# Patient Record
Sex: MALE | URBAN - METROPOLITAN AREA
[De-identification: names, ages, dates, MRNs, and addresses within clinical notes are randomized per-mention and may not be internally consistent; named-entity substitution may affect disease eponyms.]

---

## 2018-05-15 ENCOUNTER — OFFICE VISIT (OUTPATIENT)
Dept: FAMILY MEDICINE | Facility: CLINIC | Age: 33
End: 2018-05-15

## 2018-05-15 VITALS
DIASTOLIC BLOOD PRESSURE: 76 MMHG | TEMPERATURE: 97.7 F | HEART RATE: 72 BPM | WEIGHT: 158 LBS | SYSTOLIC BLOOD PRESSURE: 138 MMHG | HEIGHT: 66 IN | BODY MASS INDEX: 25.39 KG/M2

## 2018-05-15 DIAGNOSIS — B00.9 HERPES SIMPLEX: Primary | ICD-10-CM

## 2018-05-15 PROCEDURE — 99213 OFFICE O/P EST LOW 20 MIN: CPT | Performed by: FAMILY MEDICINE

## 2018-05-15 RX ORDER — ACYCLOVIR 400 MG/1
400 TABLET ORAL 3 TIMES DAILY
Qty: 15 TABLET | Refills: 5 | Status: SHIPPED | OUTPATIENT
Start: 2018-05-15

## 2018-05-15 NOTE — MR AVS SNAPSHOT
After Visit Summary   5/15/2018    Yifan Smith    MRN: [id number]           Patient Information     Date Of Birth          1985        Visit Information        Provider Department      5/15/2018 11:00 AM Sergey Dunn MD [facility]        Today's Diagnoses     Herpes simplex    -  1      Care Instructions    Take Acyclovir if you have another breakout.  Taking acyclovir now will not change the course of your resolving rash, so you are not advised to take this medication.    See your primary provider in [place] for further treatment of herpes simplex.  Herpes  If you have herpes, you re not alone. Millions of Americans have it. Herpes has no cure. But you can control it and learn how to protect yourself and others from outbreaks.  What is herpes?  Herpes is a chronic (lifelong) virus. It can cause sores and discomfort. You get it from contact with someone who carries the virus. If sores occur on the lips, you have oral herpes. If sores occur on the penis or around the vagina, you have genital herpes.  Herpes outbreaks    The first outbreak of herpes sores is usually the most severe. Then, the soldiers of the body s immune system, white blood cells, produce antibodies. These antibodies help neutralize the herpes virus and may help make future attacks less severe.    Some people have only one outbreak of sores. Some people have periods of frequent outbreaks (every few weeks). Outbreaks of herpes sores usually happen less often over time.    Herpes sores may appear without a cause. Outbreaks are more likely when the immune system is weak. Other viral infections (such as a cold) can cause outbreaks. Stress from a poor diet, fatigue, or emotional upset can lead to outbreaks of sores. Exposure to strong sunlight often causes herpes sores to reappear.   To help prevent outbreaks    To prevent oral herpes outbreaks, avoid overexposure to wind, sun, and extreme temperatures.  Use sunscreen and lip balm on affected areas.    If you are having frequent outbreaks, ask your healthcare provider about medicines that can help prevent outbreaks.  How herpes spreads to others  Herpes can be spread during an outbreak. But even without sores present, you can still  shed  the virus and infect others. You can take steps to prevent this.  To protect yourself and others    If you have an oral sore, avoid kissing and oral-genital contact.    If you have a genital sore, avoid intercourse. Also avoid oral-genital contact.    Wash your hands after touching a sore.    Use a condom each time you have sex. You can pass the virus even when sores aren t present. If you re unsure about the timing of certain kinds of physical contact, ask your health care provider.    Tell any new partners that you have herpes.    If you re a woman, have Pap tests as often as your healthcare provider recommends.    A woman can spread herpes to their  during the birth process, whether or not they have an active genital sore. If pregnant, don't forget to tell your healthcare provider early in the pregnancy.     In some cases, daily antiviral medicine (acyclovir, famcyclovir, or valavyclovir), in addition to consistent condom use, may reduce your chances of spreading herpes to an uninfected partner. Ask your healthcare provider if this medicine would be helpful for you.  Resources  American Social Health Association STD Hotline  930.716.9461  www.ashastd.org  Centers for Disease Control and Prevention  376.392.6471  www.cdc.gov/std   Date Last Reviewed: 2016-2017 [employer]. [address] [place] [address], [place] [address]. All rights reserved. This information is not intended as a substitute for professional medical care. Always follow your healthcare professional's instructions.                Follow-ups after your visit        Follow-up notes from your care team     Return if symptoms worsen or fail to  "improve.      Who to contact     If you have questions or need follow up information about today's clinic visit or your schedule please contact [facility] directly at [phone].  Normal or non-critical lab and imaging results will be communicated to you by MyChart, letter or phone within 4 business days after the clinic has received the results. If you do not hear from us within 7 days, please contact the clinic through MyChart or phone. If you have a critical or abnormal lab result, we will notify you by phone as soon as possible.  Submit refill requests through Consumer Agent Portal (CAP) or call your pharmacy and they will forward the refill request to us. Please allow 3 business days for your refill to be completed.          Additional Information About Your Visit        [facility] Information     Consumer Agent Portal (CAP) lets you send messages to your doctor, view your test results, renew your prescriptions, schedule appointments and more. To sign up, go to [email or web address] (CAP) . Click on \"Log in\" on the left side of the screen, which will take you to the Welcome page. Then click on \"Sign up Now\" on the right side of the page.     You will be asked to enter the access code listed below, as well as some personal information. Please follow the directions to create your username and password.     Your access code is: [email or web address]  Expires: 2018 11:38 AM     Your access code will  in 90 days. If you need help or a new code, please call your [facility] clinic or [phone].        Care EveryWhere ID     This is your Care EveryWhere ID. This could be used by other organizations to access your [facility] medical records  [id number]        Your Vitals Were     Pulse Temperature Height BMI (Body Mass Index)          72 97.7  F (36.5  C) (Tympanic) 5' 6\" (1.676 m) 25.5 kg/m2         Blood Pressure from Last 3 Encounters:   05/15/18 138/76    Weight from Last 3 Encounters:   05/15/18 158 lb (71.7 kg)              Today, you had " the following     No orders found for display         Today's Medication Changes          These changes are accurate as of 5/15/18 11:38 AM.  If you have any questions, ask your nurse or doctor.               Start taking these medicines.        Dose/Directions    acyclovir 400 MG tablet   Commonly known as:  ZOVIRAX   Used for:  Herpes simplex   Started by:  Sergey Dunn MD        Dose:  400 mg   Take 1 tablet (400 mg) by mouth 3 times daily   Quantity:  15 tablet   Refills:  5            Where to get your medicines      These medications were sent to [facility] [place], [place] - [address]  [address] [address]     Phone:  [phone]     acyclovir 400 MG tablet                Primary Care Provider    None Specified       No primary provider on file.        Equal Access to Services     LORRAINE ZAPIEN : Nadege Holloway, waluly cruz, qaybjaja kaalmasunil addison, ward pierre . So [facility] [phone].    ATENCIÓN: Si habla español, tiene a cedeno disposición servicios gratuitos de asistencia lingüística. Susannaame al [phone].    We comply with applicable federal civil rights laws and [place] laws. We do not discriminate on the basis of race, color, national origin, age, disability, sex, sexual orientation, or gender identity.            Thank you!     Thank you for choosing [facility]  for your care. Our goal is always to provide you with excellent care. Hearing back from our patients is one way we can continue to improve our services. Please take a few minutes to complete the written survey that you may receive in the mail after your visit with us. Thank you!             Your Updated Medication List - Protect others around you: Learn how to safely use, store and throw away your medicines at www.disposemymeds.org.          This list is accurate as of 5/15/18 11:38 AM.  Always use your most recent med list.                    Brand Name Dispense Instructions for use Diagnosis    acyclovir 400 MG tablet    ZOVIRAX    15 tablet    Take 1 tablet (400 mg) by mouth 3 times daily    Herpes simplex

## 2018-05-15 NOTE — PATIENT INSTRUCTIONS
Take Acyclovir if you have another breakout.  Taking acyclovir now will not change the course of your resolving rash, so you are not advised to take this medication.    See your primary provider in [place] for further treatment of herpes simplex.  Herpes  If you have herpes, you re not alone. Millions of Americans have it. Herpes has no cure. But you can control it and learn how to protect yourself and others from outbreaks.  What is herpes?  Herpes is a chronic (lifelong) virus. It can cause sores and discomfort. You get it from contact with someone who carries the virus. If sores occur on the lips, you have oral herpes. If sores occur on the penis or around the vagina, you have genital herpes.  Herpes outbreaks    The first outbreak of herpes sores is usually the most severe. Then, the soldiers of the body s immune system, white blood cells, produce antibodies. These antibodies help neutralize the herpes virus and may help make future attacks less severe.    Some people have only one outbreak of sores. Some people have periods of frequent outbreaks (every few weeks). Outbreaks of herpes sores usually happen less often over time.    Herpes sores may appear without a cause. Outbreaks are more likely when the immune system is weak. Other viral infections (such as a cold) can cause outbreaks. Stress from a poor diet, fatigue, or emotional upset can lead to outbreaks of sores. Exposure to strong sunlight often causes herpes sores to reappear.   To help prevent outbreaks    To prevent oral herpes outbreaks, avoid overexposure to wind, sun, and extreme temperatures. Use sunscreen and lip balm on affected areas.    If you are having frequent outbreaks, ask your healthcare provider about medicines that can help prevent outbreaks.  How herpes spreads to others  Herpes can be spread during an outbreak. But even without sores present, you can still  shed  the virus and infect others. You can take steps to prevent this.  To  protect yourself and others    If you have an oral sore, avoid kissing and oral-genital contact.    If you have a genital sore, avoid intercourse. Also avoid oral-genital contact.    Wash your hands after touching a sore.    Use a condom each time you have sex. You can pass the virus even when sores aren t present. If you re unsure about the timing of certain kinds of physical contact, ask your health care provider.    Tell any new partners that you have herpes.    If you re a woman, have Pap tests as often as your healthcare provider recommends.    A woman can spread herpes to their  during the birth process, whether or not they have an active genital sore. If pregnant, don't forget to tell your healthcare provider early in the pregnancy.     In some cases, daily antiviral medicine (acyclovir, famcyclovir, or valavyclovir), in addition to consistent condom use, may reduce your chances of spreading herpes to an uninfected partner. Ask your healthcare provider if this medicine would be helpful for you.  Resources  American Social Health Association STD Hotline  556.972.5091  www.ashastd.org  Centers for Disease Control and Prevention  681.532.4297  www.cdc.gov/std   Date Last Reviewed: 2016-2017 [employer]. [address] [place] [address], [place] [address]. All rights reserved. This information is not intended as a substitute for professional medical care. Always follow your healthcare professional's instructions.

## 2018-05-15 NOTE — PROGRESS NOTES
"  SUBJECTIVE:   Yifan Smith is a 33 year old male who presents to clinic today for the following health issues:  Chief Complaint   Patient presents with     Derm Problem     Pt here for rash.       Rash      Duration: \"few days ago\"    Description  Location: buttocks  Itching: mild  Patient denies pain around or on the rash.    Intensity:  mild    Accompanying signs and symptoms: None    History (similar episodes/previous evaluation): patient states he was diagnosed with HSV years ago - he states usually have breakout on the right buttocks.    Precipitating or alleviating factors:  New exposures:  None  Recent travel: YES- Is here from [place]     Therapies tried and outcome: acyclovir has worked in the past    Verified above history with patient.    Patient denies symptoms prior to rash appearing.  Patient states he lives in [place] - he will be leaving in a few days.   Patient states he has no PCP there yet.    Problem list and histories reviewed & adjusted, as indicated.  Additional history: as documented    Patient Active Problem List   Diagnosis     Herpes simplex     History reviewed. No pertinent surgical history.    Social History   Substance Use Topics     Smoking status: Never Smoker     Smokeless tobacco: Never Used     Alcohol use Yes      Comment: occasional     History reviewed. No pertinent family history.      Current Outpatient Prescriptions   Medication Sig Dispense Refill     acyclovir (ZOVIRAX) 400 MG tablet Take 1 tablet (400 mg) by mouth 3 times daily 15 tablet 5     No Known Allergies    Reviewed and updated as needed this visit by clinical staff  Tobacco  Allergies  Meds  Problems  Med Hx  Surg Hx  Fam Hx  Soc Hx        Reviewed and updated as needed this visit by Provider  Allergies  Meds  Problems         ROS:  C: NEGATIVE for fever, chills or change in weight  INTEGUMENTARY/SKIN: see above  MUSCULOSKELETAL: see above  H: NEGATIVE for bleeding problems    OBJECTIVE:            " "                                        /76  Pulse 72  Temp 97.7  F (36.5  C) (Tympanic)  Ht 5' 6\" (1.676 m)  Wt 158 lb (71.7 kg)  BMI 25.5 kg/m2  Body mass index is 25.5 kg/(m^2).  GEN: alert, oriented x 3, NAD  SKIN: good turgor; few dry papules but no vesicles or ulcer on scrotum; two dry scaly ulcers 3 mm wide on the right gluteal area; no other rash  GENITALIA: skin lesions as above, no urethral discharge, no scrotal swelling; no testicular mass palpable; no inguinal adenopathy    Diagnostic test results:  Diagnostic Test Results:  none      ASSESSMENT/PLAN:                                                        ICD-10-CM    1. Herpes simplex B00.9 acyclovir (ZOVIRAX) 400 MG tablet     Patient was advised no active lesions, so no indication to take acyclovir now.  Rx given for future outbreaks.  Patient was advised to establish care with PCP when he returns to [place].  Advised safe sexual practices.  Return precautions discussed and given to patient.      Follow up with Provider - prn   Patient Instructions     Take Acyclovir if you have another breakout.  Taking acyclovir now will not change the course of your resolving rash, so you are not advised to take this medication.    See your primary provider in [place] for further treatment of herpes simplex.  Herpes  If you have herpes, you re not alone. Millions of Americans have it. Herpes has no cure. But you can control it and learn how to protect yourself and others from outbreaks.  What is herpes?  Herpes is a chronic (lifelong) virus. It can cause sores and discomfort. You get it from contact with someone who carries the virus. If sores occur on the lips, you have oral herpes. If sores occur on the penis or around the vagina, you have genital herpes.  Herpes outbreaks    The first outbreak of herpes sores is usually the most severe. Then, the soldiers of the body s immune system, white blood cells, produce antibodies. These antibodies help " neutralize the herpes virus and may help make future attacks less severe.    Some people have only one outbreak of sores. Some people have periods of frequent outbreaks (every few weeks). Outbreaks of herpes sores usually happen less often over time.    Herpes sores may appear without a cause. Outbreaks are more likely when the immune system is weak. Other viral infections (such as a cold) can cause outbreaks. Stress from a poor diet, fatigue, or emotional upset can lead to outbreaks of sores. Exposure to strong sunlight often causes herpes sores to reappear.   To help prevent outbreaks    To prevent oral herpes outbreaks, avoid overexposure to wind, sun, and extreme temperatures. Use sunscreen and lip balm on affected areas.    If you are having frequent outbreaks, ask your healthcare provider about medicines that can help prevent outbreaks.  How herpes spreads to others  Herpes can be spread during an outbreak. But even without sores present, you can still  shed  the virus and infect others. You can take steps to prevent this.  To protect yourself and others    If you have an oral sore, avoid kissing and oral-genital contact.    If you have a genital sore, avoid intercourse. Also avoid oral-genital contact.    Wash your hands after touching a sore.    Use a condom each time you have sex. You can pass the virus even when sores aren t present. If you re unsure about the timing of certain kinds of physical contact, ask your health care provider.    Tell any new partners that you have herpes.    If you re a woman, have Pap tests as often as your healthcare provider recommends.    A woman can spread herpes to their  during the birth process, whether or not they have an active genital sore. If pregnant, don't forget to tell your healthcare provider early in the pregnancy.     In some cases, daily antiviral medicine (acyclovir, famcyclovir, or valavyclovir), in addition to consistent condom use, may reduce your  chances of spreading herpes to an uninfected partner. Ask your healthcare provider if this medicine would be helpful for you.  Resources  American Social Health Association STD Hotline  173.821.7247  www.ashastd.org  Centers for Disease Control and Prevention  817.973.5084  www.cdc.gov/std   Date Last Reviewed: 12/1/2016 2000-2017 [employer]. [address] [place] [address]. All rights reserved. This information is not intended as a substitute for professional medical care. Always follow your healthcare professional's instructions.            Sergey Dunn MD  [facility]